# Patient Record
Sex: FEMALE | Race: WHITE
[De-identification: names, ages, dates, MRNs, and addresses within clinical notes are randomized per-mention and may not be internally consistent; named-entity substitution may affect disease eponyms.]

---

## 2017-01-13 ENCOUNTER — HOSPITAL ENCOUNTER (EMERGENCY)
Dept: HOSPITAL 45 - C.EDB | Age: 42
Discharge: HOME | End: 2017-01-13
Payer: COMMERCIAL

## 2017-01-13 VITALS — HEART RATE: 83 BPM | SYSTOLIC BLOOD PRESSURE: 125 MMHG | OXYGEN SATURATION: 99 % | DIASTOLIC BLOOD PRESSURE: 79 MMHG

## 2017-01-13 VITALS
HEIGHT: 65.98 IN | WEIGHT: 118.39 LBS | WEIGHT: 118.39 LBS | HEIGHT: 65.98 IN | BODY MASS INDEX: 19.03 KG/M2 | BODY MASS INDEX: 19.03 KG/M2

## 2017-01-13 VITALS — TEMPERATURE: 97.7 F

## 2017-01-13 DIAGNOSIS — R42: Primary | ICD-10-CM

## 2017-01-13 LAB
ALP SERPL-CCNC: 44 U/L (ref 45–117)
ALT SERPL-CCNC: 25 U/L (ref 12–78)
ANION GAP SERPL CALC-SCNC: 13 MMOL/L (ref 3–11)
APPEARANCE UR: (no result)
AST SERPL-CCNC: 12 U/L (ref 15–37)
BASOPHILS # BLD: 0.02 K/UL (ref 0–0.2)
BASOPHILS NFR BLD: 0.2 %
BILIRUB UR-MCNC: (no result) MG/DL
BUN SERPL-MCNC: 14 MG/DL (ref 7–18)
BUN/CREAT SERPL: 17 (ref 10–20)
CALCIUM SERPL-MCNC: 8.9 MG/DL (ref 8.5–10.1)
CHLORIDE SERPL-SCNC: 104 MMOL/L (ref 98–107)
CKMB/CK RATIO: 1.8 (ref 0–3)
CO2 SERPL-SCNC: 22 MMOL/L (ref 21–32)
COLOR UR: YELLOW
COMPLETE: YES
CREAT CL PREDICTED SERPL C-G-VRATE: 75.9 ML/MIN
CREAT SERPL-MCNC: 0.81 MG/DL (ref 0.6–1.2)
EOSINOPHIL NFR BLD AUTO: 292 K/UL (ref 130–400)
GLUCOSE SERPL-MCNC: 102 MG/DL (ref 70–99)
HCT VFR BLD CALC: 44.3 % (ref 37–47)
IG%: 0.3 %
IMM GRANULOCYTES NFR BLD AUTO: 14.8 %
LYME DISEASE AB IGG: (no result)
LYME DISEASE AB IGM: (no result)
LYMPHOCYTES # BLD: 1.54 K/UL (ref 1.2–3.4)
MANUAL MICROSCOPIC REQUIRED?: NO
MCH RBC QN AUTO: 30.2 PG (ref 25–34)
MCHC RBC AUTO-ENTMCNC: 33.6 G/DL (ref 32–36)
MCV RBC AUTO: 89.9 FL (ref 80–100)
MONOCYTES NFR BLD: 4.1 %
NEUTROPHILS # BLD AUTO: 0.7 %
NEUTROPHILS NFR BLD AUTO: 79.9 %
NITRITE UR QL STRIP: (no result)
PH UR STRIP: 7 [PH] (ref 4.5–7.5)
PMV BLD AUTO: 11.4 FL (ref 7.4–10.4)
POTASSIUM SERPL-SCNC: 3.9 MMOL/L (ref 3.5–5.1)
PREG INTERNAL NEGATIVE QC: (no result)
PREG INTERNAL POSITIVE QC: (no result)
RBC # BLD AUTO: 4.93 M/UL (ref 4.2–5.4)
REVIEW REQ?: NO
SODIUM SERPL-SCNC: 139 MMOL/L (ref 136–145)
SP GR UR STRIP: 1.03 (ref 1–1.03)
URINE BILL WITH OR WITHOUT MIC: (no result)
URINE EPITHELIAL CELL AUTO: >30 /LPF (ref 0–5)
UROBILINOGEN UR-MCNC: (no result) MG/DL
WBC # BLD AUTO: 10.44 K/UL (ref 4.8–10.8)

## 2017-01-13 NOTE — DIAGNOSTIC IMAGING REPORT
CHEST ONE VIEW PORTABLE



CLINICAL HISTORY: Weakness, dizziness, nausea, vomiting.    



COMPARISON STUDY:  No previous studies for comparison.



FINDINGS: The cardiac and mediastinal contours are normal. There is no evidence

of focal pulmonary consolidation. There is no evidence of failure. No pleural

effusions are visualized.[ 



IMPRESSION: No active disease in the chest.







Electronically signed by:  Karlos Pierce M.D.

1/13/2017 2:36 PM



Dictated Date/Time:  1/13/2017 2:36 PM

## 2017-01-13 NOTE — DIAGNOSTIC IMAGING REPORT
CT HEAD WITHOUT CONTRAST (CT)



CLINICAL HISTORY: Acute onset lightheadedness. Nausea and vomiting.    



COMPARISON STUDY:  No previous studies for comparison.



TECHNIQUE:  Axial CT of the brain is performed from the vertex to the skull

base. IV contrast was not administered for this examination.



CT DOSE: 537.48 mGy.cm



FINDINGS:



No intra or extra-axial mass lesions are visualized. There is no CT evidence of

acute cortical infarction. There is no evidence of midline shift. There is no

acute  hemorrhage. No calvarial fractures are visualized. 

There are minimal white matter hypodensities likely on a small vessel basis.

There is no evidence of pathologic ventricular dilatation.

There is no evidence of acute sinusitis



IMPRESSION: No acute intracranial findings







Electronically signed by:  Karlos Pierce M.D.

1/13/2017 4:28 PM



Dictated Date/Time:  1/13/2017 4:27 PM

## 2017-01-15 NOTE — EMERGENCY ROOM VISIT NOTE
ED Visit Note


First contact with patient:  13:47


Chief Complaint: I really dizzy and weak.





History of Present Illness: Ms. Rowan is a 41-year-old white female who 

ambulates into the ED accompanied by her mother-in-law complaining of dizziness

, nausea, one episode of vomiting and sensations of weakness.


Patient reports she has been feeling well over the last few days.  This morning 

she woke from sleep and had her normal routine and went to work.  While at work 

approximately an hour ago she reports she was talking to a fellow employee.  

She turned her head to go back to her office pain and had an acute onset of 

dizziness with nausea and one episode of vomiting.  Since she has vomited and 

her symptoms she reports she is feeling very weak and feels like she cannot 

move because her symptoms will intensify.


While evaluating my patient her dizziness had no any component and when I 

explained the difference between dizziness and lightheadedness she agreed her 

symptoms were more like lightheadedness.  She reports the symptom has been 

constant since its onset.  Her lightheadedness worsens with all movements of 

her head.  She has not a then to find any alleviating factors related to the 

lightheadedness.  She has not had any medications for her lightheadedness prior 

to arrival at the hospital.  As previously noted she does feel the 

lightheadedness has made her extremely fatigued.


She denies fevers, chills, sweats, skin eruptions, skin color changes, headache

, recent head trauma, previous significant head injury/surgeries, visual changes

, hearing changes, difficulty speaking, difficulty swallowing, difficulty 

ambulating/coordinating body movements, neck pain/stiffness, back pain, chest 

pain, shortness of breath, palpitations, abdominal pain, extremity weakness/

numbness/tingling.





Review of Systems: As noted above in history of present illness.  All body 

systems were reviewed and found to be negative as noted above.





Past Medical History: Patient denies.


Current Medications: Birth control.


Allergies to Medications: Patient denies.


Social History: Patient is currently employed; she feels safe environment; she 

denies tobacco and alcohol use.





Physical Examination:


Vital Signs:








  Date Time  Temp Pulse Resp B/P Pulse Ox O2 Delivery O2 Flow Rate FiO2


 


1/13/17 16:53  83 18 125/79 99 Room Air  


 


1/13/17 14:30  85 18  98 Room Air  


 


1/13/17 14:28  71  124/84    





  90  138/89    





  74  135/91    


 


1/13/17 14:22  74      


 


1/13/17 13:16 36.5 71 18 153/84 100 Room Air  





GENERAL: 41-year-old female in mild to moderate distress due to symptoms, 

nontoxic-appearing, afebrile and hemodynamically stable.  Patient was found 

lying in a darkened room with her eyes closed.


NEUROLOGICAL: Awake, alert and oriented to person, place and time.  Answering 

questions appropriately and following commands.  Cranial nerves II through XII 

grossly intact.  Funduscopic examination was deferred due to lightheadedness.  

Short-term and long-term recall.  Good hand eye coordination.  No focal motor 

or sensory deficits.  Normal rapid alternating movements of the hands and 

fingers.  After resolution of most of her discomfort she had a normal gait.  

Romberg test negative.  Pronator drift test negative.


SKIN: Warm, dry and pink.  No soft tissue eruptions or trauma noted.


HEENT:  Atraumatic and normocephalic.  PERRLA.  EOMI without nystagmus.  

External ears are nontender.  Auditory canals are pink and patent.  Tympanic 

membranes were pearly gray with normal light reflex.  No carotid bruits.  

Sclera white and conjunctiva pink.  No drainage from naris.  Oral cavity moist 

and pink.  Pharynx is nonerythematous or edematous.  Speech normal.  No 

lymphadenopathy.  Trachea midline.  No jugular venous distention.


BACK:  No tenderness over the bony spine.  No CVA tenderness.  


THORAX:  Lungs sounds are clear to auscultation and equal bilaterally with 

symmetrical chest wall.  No wheezing, rales or rhonchi.  No crepitus, tenderness

, subcutaneous air or deformities noted.


HEART:  Regular rate and rhythm.  No gallops, rubs or murmurs are appreciated.


ABDOMEN: Flat, soft and nontender.  Positive bowel sounds in all quadrants.  No 

guarding, rigidity or organomegaly.


EXTREMITIES:  Moves all extremities well on command and with purpose.  All 

distal neurovascular statuses are intact and equal bilaterally.  No calf 

tenderness or cords.  4/5 muscle strength in flexion, extension, abduction and 

abduction of the shoulders and hips, flexion and extension of the elbows, 

wrists and knees, pronation and supination of forearms, plantar flexion and 

dorsiflexion of the ankles and  strength.





ED Course:


Patient is assessed as noted above.


Laboratory Testing:








Test


  1/13/17


14:25 1/13/17


14:28 1/13/17


14:30 Range/Units


 


 


White Blood Count 10.44   4.8-10.8  K/uL


 


Red Blood Count 4.93   4.2-5.4  M/uL


 


Hemoglobin 14.9   12.0-16.0  g/dL


 


Hematocrit 44.3   37-47  %


 


Mean Corpuscular Volume 89.9     fL


 


Mean Corpuscular Hemoglobin 30.2   25-34  pg


 


Mean Corpuscular Hemoglobin


Concent 33.6


  


  


  32-36  g/dl


 


 


Platelet Count 292   130-400  K/uL


 


Mean Platelet Volume 11.4   7.4-10.4  fL


 


Neutrophils (%) (Auto) 79.9    %


 


Lymphocytes (%) (Auto) 14.8    %


 


Monocytes (%) (Auto) 4.1    %


 


Eosinophils (%) (Auto) 0.7    %


 


Basophils (%) (Auto) 0.2    %


 


Neutrophils # (Auto) 8.35   1.4-6.5  K/uL


 


Lymphocytes # (Auto) 1.54   1.2-3.4  K/uL


 


Monocytes # (Auto) 0.43   0.11-0.59  K/uL


 


Eosinophils # (Auto) 0.07   0-0.5  K/uL


 


Basophils # (Auto) 0.02   0-0.2  K/uL


 


RDW Standard Deviation 42.0   36.4-46.3  fL


 


RDW Coefficient of Variation 13.0   11.5-14.5  %


 


Immature Granulocyte % (Auto) 0.3    %


 


Immature Granulocyte # (Auto) 0.03   0.00-0.02  K/uL


 


Urine Color YELLOW    


 


Urine Appearance TURBID   CLEAR  


 


Urine pH 7.0   4.5-7.5  


 


Urine Specific Gravity 1.026   1.000-1.030  


 


Urine Protein TRACE   NEG  


 


Urine Glucose (UA) NEG   NEG  


 


Urine Ketones 1+   NEG  


 


Urine Occult Blood NEG   NEG  


 


Urine Nitrite NEG   NEG  


 


Urine Bilirubin NEG   NEG  


 


Urine Urobilinogen NEG   NEG  


 


Urine Leukocyte Esterase NEG   NEG  


 


Urine WBC (Auto) 0   0-5  /hpf


 


Urine RBC (Auto) 0-4   0-4  /hpf


 


Urine Hyaline Casts (Auto) 1-5   0-5  /lpf


 


Urine Epithelial Cells (Auto) >30   0-5  /lpf


 


Urine Bacteria (Auto) NEG   NEG  


 


Sodium Level 139   136-145  mmol/L


 


Potassium Level 3.9   3.5-5.1  mmol/L


 


Chloride Level 104     mmol/L


 


Carbon Dioxide Level 22   21-32  mmol/L


 


Anion Gap 13.0   3-11  mmol/L


 


Blood Urea Nitrogen 14   7-18  mg/dl


 


Creatinine


  0.81


  


  


  0.60-1.20


mg/dl


 


Est Creatinine Clear Calc


Drug Dose 75.9


  


  


   ml/min


 


 


Estimated GFR (


American) 103.1


  


  


   


 


 


Estimated GFR (Non-


American 89.0


  


  


   


 


 


BUN/Creatinine Ratio 17.0   10-20  


 


Random Glucose 102   70-99  mg/dl


 


Calcium Level 8.9   8.5-10.1  mg/dl


 


Total Bilirubin 0.4   0.2-1  mg/dl


 


Direct Bilirubin 0.1   0-0.2  mg/dl


 


Aspartate Amino Transf


(AST/SGOT) 12


  


  


  15-37  U/L


 


 


Alanine Aminotransferase


(ALT/SGPT) 25


  


  


  12-78  U/L


 


 


Alkaline Phosphatase 44     U/L


 


Total Creatine Kinase 51     U/L


 


Creatine Kinase MB 0.9   0.5-3.6  ng/ml


 


Creatine Kinase MB Ratio 1.8   0-3.0  


 


Total Protein 8.1   6.4-8.2  gm/dl


 


Albumin 4.3   3.4-5.0  gm/dl


 


Lipase 208     U/L


 


Human Chorionic Gonadotropin,


Qual NEG


  


  


  NEG  


 


 


Lyme Disease IgG Antibody NEG   NEG  


 


Lyme Disease IgM Antibody NEG   NEG  


 


Bedside Troponin I  0.000  0-0.045  ng/ml


 


Influenza Type A Antigen   Neg for Influ A NEG  


 


Influenza Type B Antigen   Neg for Influ B NEG  





EKG: Was read by myself and reviewed with Dr. Veras; shows normal sinus rhythm 

with short IA interval.  Ventricular rate 71 bpm.  Normal axis, intervals and 

complexes.  No acute ST changes and ischemia.  No previous EKGs trauma record.  


Chest X-Rays: Were read by myself and shows no acute infiltrates, effusions or 

pneumothorax.  Normal heart silhouette and bony anatomy.


Orthostatic signs were unremarkable.


Her stay in the emergency department patient reports that she started 

developing a headache behind her eyes.  She was offered pain medication and 

refused.


She continued to have lightheadedness sensations after blood work and EKGs were 

reviewed.  I did offer her head CT for further evaluation and she agreed.


Noncontrasted Head CT: Was reviewed by myself and read by the radiologist and 

shows no acute intracranial findings.


Patient was given 600 mg of ibuprofen for her headache by mouth and 25 mg of 

Meclizine by mouth for her lightheadedness.


Patient was reassessed multiple times during her stay in the emergency 

department.


Patient's case was reviewed with Dr. Boo; we agreed on diagnostic approach, 

treatment, disposition and plan.


Patient was educated about tonight's findings and instructed on her treatment 

plan; she verbalizes understanding and agreement with this plan.  


 


Clinical Impression: Vertigo.





Decision-Making: Initially my differential diagnosis I considered vertigo, GI 

bleed, intracranial bleed, Lyme's disease, dehydration, stress/anxiety, 

arrhythmia, MI and other causes.





Disposition: Patient discharged home in stable condition accompanied by family 

member; prior to departure she was reassessed and subjectively reported she was 

feeling better.  Also prior to departure she was ambulated around the 

department and was able to do so without difficulty and she was able to 

tolerate fluids and food.





Plan:


Patient was encouraged to use ibuprofen or acetaminophen as needed for pain.


Patient was prescribed 25 mg of Meclizine every 6 hours as needed for dizziness/

lightheadedness/vertigo symptoms.


Patient was encouraged to stay well-hydrated and avoid alcohol.


Patient was encouraged to follow-up with her PCP for recheck.


Patient was encouraged return the ED for worsening symptoms, severe headaches, 

uncontrolled vomiting, any new abnormal neurological symptoms or any new/

concerning symptom

## 2017-02-06 ENCOUNTER — HOSPITAL ENCOUNTER (OUTPATIENT)
Dept: HOSPITAL 45 - C.PAPS | Age: 42
Discharge: HOME | End: 2017-02-06
Attending: OBSTETRICS & GYNECOLOGY
Payer: COMMERCIAL

## 2017-02-06 DIAGNOSIS — Z01.419: Primary | ICD-10-CM

## 2018-02-12 ENCOUNTER — HOSPITAL ENCOUNTER (OUTPATIENT)
Dept: HOSPITAL 45 - C.PAPS | Age: 43
Discharge: HOME | End: 2018-02-12
Attending: OBSTETRICS & GYNECOLOGY
Payer: COMMERCIAL

## 2018-02-12 DIAGNOSIS — Z01.419: Primary | ICD-10-CM
